# Patient Record
Sex: MALE | Race: NATIVE HAWAIIAN OR OTHER PACIFIC ISLANDER | NOT HISPANIC OR LATINO | URBAN - METROPOLITAN AREA
[De-identification: names, ages, dates, MRNs, and addresses within clinical notes are randomized per-mention and may not be internally consistent; named-entity substitution may affect disease eponyms.]

---

## 2023-07-23 ENCOUNTER — OFFICE VISIT (OUTPATIENT)
Dept: URGENT CARE | Facility: URGENT CARE | Age: 73
End: 2023-07-23
Payer: MEDICARE

## 2023-07-23 VITALS
OXYGEN SATURATION: 93 % | HEART RATE: 82 BPM | TEMPERATURE: 98.6 F | SYSTOLIC BLOOD PRESSURE: 128 MMHG | RESPIRATION RATE: 20 BRPM | WEIGHT: 228.1 LBS | DIASTOLIC BLOOD PRESSURE: 70 MMHG

## 2023-07-23 DIAGNOSIS — S61.214A LACERATION OF RIGHT RING FINGER WITHOUT DAMAGE TO NAIL, FOREIGN BODY PRESENCE UNSPECIFIED, INITIAL ENCOUNTER: Primary | ICD-10-CM

## 2023-07-23 PROCEDURE — 99207 PR NO CHARGE LOS: CPT | Mod: F8 | Performed by: NURSE PRACTITIONER

## 2023-07-23 RX ORDER — LOSARTAN POTASSIUM 25 MG/1
1 TABLET ORAL AT BEDTIME
COMMUNITY

## 2023-07-23 RX ORDER — AMLODIPINE BESYLATE 5 MG/1
1 TABLET ORAL AT BEDTIME
COMMUNITY

## 2023-07-23 RX ORDER — HYDROCHLOROTHIAZIDE 25 MG/1
TABLET ORAL
COMMUNITY
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
## 2023-08-13 ENCOUNTER — HEALTH MAINTENANCE LETTER (OUTPATIENT)
Age: 73
End: 2023-08-13

## 2024-10-06 ENCOUNTER — HEALTH MAINTENANCE LETTER (OUTPATIENT)
Age: 74
End: 2024-10-06

## 2024-12-11 ENCOUNTER — ALLIED HEALTH/NURSE VISIT (OUTPATIENT)
Dept: RESEARCH | Facility: CLINIC | Age: 74
End: 2024-12-11
Payer: MEDICARE

## 2024-12-11 VITALS
WEIGHT: 221 LBS | SYSTOLIC BLOOD PRESSURE: 142 MMHG | HEART RATE: 85 BPM | HEIGHT: 69 IN | DIASTOLIC BLOOD PRESSURE: 65 MMHG | OXYGEN SATURATION: 95 % | BODY MASS INDEX: 32.73 KG/M2

## 2024-12-11 DIAGNOSIS — Z00.6 EXAMINATION OF PARTICIPANT OR CONTROL IN CLINICAL RESEARCH: Primary | ICD-10-CM

## 2024-12-11 PROCEDURE — 99207 PR NO CHARGE NURSE ONLY: CPT

## 2024-12-11 NOTE — PROGRESS NOTES
[place] Inclusion/Exclusion Criteria:    Study Name: [place] (-[id number])      : Joselyn Bauer MD      Study Description: The purpose of this study is to explore potential relationships between physiologic parameters collected from sensor data with physiological changes potentially induced by the administration of the COVID-19 vaccine.     Protocol Version: 4.0 (Version Date: 07-OCT-2024) Consent Version: 4.0 (Version Date: 09-OCT-2024)    Inclusion #  Inclusion Criteria (ALL MUST BE YES)  YES/NO/N/A   1 Be at least 18 years old  Yes   2 Proficient in written and spoken English, defined by self-report   Yes   3 Willing and able to participate in the study procedures and data consent described in the consent form   Yes   4 Able to communicate effectively with and follow instructions from the Study Team    Yes   5   Eligible to receive the updated COVID-19 vaccine based on current CDC recommendations and vaccine prescribing information. (As determined by Sub-I) Yes   6   Able to disclose home address to a healthcare provider or Study Team member to enable (a) device shipping (if necessary) and (b) a 911 call in case of potential emergency (home address will not be kept as study data)  Yes   7    Able to adhere to Lifestyle Considerations (see Section 5.3) throughout study duration (as applicable). These include avoiding taking certain over-the-counter pain relievers or fever reducing medications around the time of vaccination, not taking any recreational drugs (e.g. methamphetamines, cocaine, opioids, cannabis, LSD)  within 72 hours prior to, during and after the ingestible temperature sensor monitoring period; and abstaining from strenuous exercise, ingestion of hot or cold liquids, eating food, chewing gum or mints, brushing teeth or smoking 30 minutes before taking oral temperature measurements.  Yes   8   Participant has their own reliable high-speed broadband internet at their home and  "active at the time of data collection  Yes   9   Have a personal computer, desktop, laptop, tablet, or smartphone with audiovisual capabilities Yes   If any inclusion criteria marked \"No\" please provide detail (If all Yes, N/A): N/A        Exclusion # Exclusion Criteria (ALL MUST BE NO) YES/NO/N/A   1 Participants with tattoos, skin problems or wound(s) on/in the wrist or deltoid (ex: injured or friable skin, skin disorders, or allergic skin reactions, such as eczema, rosacea, impetigo, dermatomyositis, or allergic contact dermatitis), that can interfere with study setup/assessments/vaccination  No   2 Individuals who are pregnant or plan to become pregnant during the study  No   3 Anything that may interfere with proper physiological data acquisition, such as an implantable device (e.g., cardiac pacemaker, automated implantable cardioverter-defibrillator, deep brain stimulator, Inspire upper airway stimulation device) and casts or body braces No   4 Participants that are diagnosed or are suspected to have illnesses affecting motion: e.g., Parkinson's, Essential Tremor, Dystonia, or others at investigator's discretion No   5 Participants that are diagnosed with a condition or taking a medication that impairs the immune system (i.e., active cancer, HIV/AIDS, organ/stem cell transplant recipient, autoimmune disorders, primary immunodeficiencies) No   6 Participants with any medical history, vital sign, or any other study procedure finding/assessment that in the opinion of the investigator could compromise participant safety during study participation or interfere with the study integrity and/or the accurate assessment of the study objectives No   7 Daily use of OTC or prescription medications with antipyretic properties at time of enrollment that is anticipated to continue during the CBT sensor data collection period surrounding administration of vaccines. Low dose aspirin (81 mg or less per day) taken for " "preventative purposes is permissible No   8 Individuals who are unwilling to avoid taking OTC pain relievers and anti-pyrectics for acute mild to moderate pain and fever associated with vaccine administration during the data collection days surrounding its administration No   9 Participant works for a company that develops or sells medical and/or fitness devices (e.g., ECG monitors, wearable fitness bands, sleep monitors, etc.) or are technology journalists (e.g., professional bloggers, TV, magazine, newspaper reporters, etc.) No   10 Overnight travel or travel between time zones planned during CBT sensor data collection nights No   11 Participants with planned overnight travel totaling >= 7 nights during duration of study data collection period No   12 Participant plans on moving or changing address within the study period No   13 Participant is employed in overnight shift work, or otherwise does not maintain a reasonably consistent day/night schedule (e.g., participants who are unable to regularly go to bed between 7pm to 2am and wake up between 4am to 12pm on average >= 3 times a week) No   14 Participants with clinically relevant sleep disturbances and unable to achieve at least 4 hours of continuous sleep on average each night No   If any exclusion criteria marked \"Yes\" please provide detail (If all No, N/A): N/A    Exclusion (a) # Exclusion criteria related to the COVID-19 vaccine:   (ALL MUST BE NO) YES/NO/N/A   1 Participants with a known history of a severe allergic reaction (e.g., anaphylaxis) to any component of the COVID-19 vaccine. No   2 Participants who experienced severe side effects following previous administration of the COVID-19 vaccine including myocarditis, pericarditis, thrombosis, or thrombocytopenia No   3 Participants in whom an additional COVID-19 vaccine is contraindicated. No   If any exclusion criteria marked \"Yes\" please provide detail (If all No, N/A): N/A    Exclusion (b) # Exclusion " criteria related to Ingestible Temperature Sensor:   (ALL MUST BE NO) YES/NO/N/A   1 Participants under the age of 18 No   2 Participant weighs less than 40 kg (88 lbs.) or BMI greater than 44.6 No   3 Participants who are pregnant No   4 Participants with a known diagnosis of obstructive disease of the gastrointestinal tract or a known hernia, Crohn's disease, diverticulitis, or other inflammatory bowel disease. No   5 Participants with a 1st degree relative with any inflammatory bowel disease with suspected hereditary transmission (e.g., Crohn's disease, or ulcerative colitis) No   6 Participants with known history of disordered or impaired gag reflex  No   7 Participants who have problems swallowing food or pills (e.g., dysphagia) No   8 Participants with previous gastrointestinal tract surgery involving the esophagus, stomach, or intestines, excluding intraluminal endoscopy. No   9 Participants with known diagnosis of hypo-motility disorders of the gastrointestinal tract (including chronic constipation with fewer than three spontaneous bowel movements per week No   10 Participants with chronic diarrhea, as defined by 3 or more episodes of diarrhea per week for the last 30 days or >= 3 bowel movements per day No   11 Participants with known diagnosis of felinization of the esophagus (unusual folding of the esophagus)  No   12 Participants with Zenker's diverticulum (a pouch that forms in the upper part of the esophagus) and people with a history of other diverticula.  No   13 Participants who may undergo NMR or MRI scanning within one week of CBT sensor ingestion No   14 Participants with an implantable pulse generator or implantable electro-medical device of any kind (e.g., pacemakers (or implantable pulse generators), implantable cardioverter defibrillators (ICDs), deep brain stimulation (DBS) devices, and left ventricular assist devices (LVADs). No   15 Participants with an implanted or temporarily implanted  "device that uses an external power-source. No   If any exclusion criteria marked \"Yes\" please provide detail (If all No, N/A): N/A    Will the participant continue in the study? Yes  (If \"No\", follow instructions for handling of Screen Failures)    If the participant is eligible to continue in the study, inclusion/exclusion criteria above will be sent to the PI for co-sign.    Enrollment Date:  11-DEC-2024      MD Gala Morel, EP   "

## 2024-12-11 NOTE — PROGRESS NOTES
"  [place] Study Physical Exam  Study Description: The purpose of this study is to explore potential relationships between physiologic parameters collected from sensor data with physiological changes potentially induced by the administration of the COVID-19 vaccine.     Medical History Reviewed? Yes  After extensive review of the entire available medical record, to the best of my knowledge there is no reason to exclude this patient from the study.     Physical Examination  For abnormal findings, please evaluate if the finding is Clinically Significant (by 'CS') or Not Clinically Significant (by 'NCS')  General Appearance   Normal  Head and Neck   Abnormal; NCS wears full dentures  Lungs     Normal  Cardiovascular   Normal   Do they have a stimulator/Pacemaker? No  Gastrointestinal   Normal   Problems swallowing medication? No  ANY history of diverticula (diverticulosis, diverticulitis, etc): No  Any history of GI surgery? No  Bowel habits: Regular, every day; drinks 2 quarts water, 1 cup coffee, 1 can coke and has milk at lunch every day; activity: walks 30 min/d and does stretching exercises   Regular laxative use? No  Musculoskeletal/Extremities Abnormal; NCS well healed scar right mid forearm    Lymph Nodes    Normal  Skin     Normal     Any Tattoos or Skin issues on the wrists or deltoid? No  Neurological    Normal   Any sleep disturbances? (Must get at least 5 hours a night) No   Memory issues?  No    Tremor (If present document)  Present bilateral mild hand tremor  Any balance issues or recent falls?     No    Past Surgical History:   Procedure Laterality Date    radial surgery  1968    removal of all teeth  2017       Vitals:    12/11/24 0937   BP: 142/65   BP Location: Left arm   Patient Position: Sitting   Cuff Size: Adult Large   Pulse: 85   SpO2: 95%   Weight: 100.2 kg (221 lb)   Height: 1.753 m (5' 9\")           Allergies   Allergen Reactions    Codeine              Immunization History   Administered " Date(s) Administered    COVID-19 12+ (2023-24) (NOVAVAX) 09/17/2024    COVID-19 12+ (MODERNA) 03/18/2024    COVID-19 12+ (Pfizer) 09/22/2023       Reminders:  Are they using prescription pain meds? No  Any first degree relatives with inflammatory bowel disease? (Crohn's, ulcerative colitis, etc) No  Any serious medical issues that require treatment and evaluation? No   Any conditions they are following closely with their PCP? No    Have you had any serious issues with previous Covid-19 immunizations? No  COVID Vaccine Screening   Have you received a dose of the Covid-19 vaccine before?   Yes, Novavax  Date of most recent Covid-19 vaccine dose:     17-September-2024   Do you currently have a health condition or are undergoing    treatment that makes you moderately to severely immunocompromised?* No  Have you ever had an allergic reaction to a Covid vaccine?**  No  Have you ever had an allergic reaction to another vaccine or injectable  medication?         No  Have you ever felt dizzy or faint before, during or after a shot?   No    *Ex: treatment of cancer, HIV, organ transplant recipient, immunosuppressive therapy, etc.     **This would include a severe allergic reaction (e.g., anaphylaxis that required treatment with epinephrine or caused you to go to the hospital. It would also include an allergic reaction that caused hives, swelling, or respiratory distress, including wheezing)    Is this subject eligible to receive a Covid-19 vaccine? Yes    Patient does fulfill study inclusion criteria and no exclusion criteria are found. Subject will continue in the study. This decision was made at 10:35    11-DEC-2024    Stefany Hugo PA-C

## 2024-12-11 NOTE — PROGRESS NOTES
[place] Screening Study Note  Study Description: The purpose of this study is to explore potential relationships between physiologic parameters collected from sensor data with physiological changes potentially induced by the administration of the COVID-19 vaccine.       Subject ID:      Demographic Info  Armando Peralta   1950          74 year old    SCREENING   Sex: Male    Multi Racial?: No; Primary:  or Other    Ethnicity: Not  or      Medical Conditions:   Has the subject experienced any relevant past and/or concomitant Medical History (e.g., chronic conditions such as hypertension, cardiovascular disease, stroke, diabetes, kidney disease, peripheral arterial disease, Raynaud's syndrome?  Yes    Condition Ongoing? Start Date (MM/DD/YYYY) End Date (if applicable)   Hypertension Yes JUL- Not Applicable   Obstructive Sleep Apnea Yes Un-Un-2016        Any History of...  Verify with chart review. If any of the below are yes, the subject is a screen fail.  -Divertic___ (diverticulosis, diverticula, diverticulitis, etc.)? No  -Rheumatoid arthritis? No  Lupus? No  Hernia? (Other than inguinal or childhood umbilical)  No  Peptic Ulcer? No  Crohn's Disease? No  In any 1st degree family members? No  Colitis? No  Dysphagia? No  Parkinson s? No  Essential Tremor? No      Concomitant Medications:   Prescribed medications reviewed with the participant. The table below represents their current medications they are taking on a regular basis.        Medication Name (Generic) Class Start Date (MM/DD/YYYY) Ongoing? Dose Unit Frequency Route Indication   Hydrochlorothiazide  Other JUL- Yes 25  mg QD Oral Con Med Cond: Hypertension   Amlodipine Other JUL- Yes 5 mg QD Oral Con Med Cond: Hypertension   Losartan Other JUL- Yes 25 mg QD Oral Con Med Cond: Hypertension       Allergies:   Does the subject have any known allergies to medications, food, a  "vaccine component, or latex? Yes  Allergies   Allergen Reactions    Codeine         Surgical History  Any history of gastrointestinal tract surgery involving the esophagus, stomach, or intestines? No  If there are no GI surgeries, delete whole surgery section.     Family Medical History  Family medical history was reviewed. No first degree relative has a history of any inflammatory bowel disease with suspected hereditary transmission (eg. Crohn's, ulcerative colitis, etc)       Subject Characteristics     Vitals  BP (!) 142/65 (BP Location: Left arm, Patient Position: Sitting, Cuff Size: Adult Large)   Pulse 85   Ht 1.753 m (5' 9\")   Wt 100.2 kg (221 lb)   SpO2 95%   BMI 32.64 kg/m         Avila Scale:  Wrist Circumference: Study Watch Wrist Preferred Watch Wrist Dominant Hand Watch Band Tightness:   3 19.5 cm Right Right Right Natural      Watch Size Preference: 45mm    ENROLLMENT    Was the visit performed? Yes   Date of Enrollment: 11-DEC-2024. All procedures below occurred on this day.    Site Zip Code: Site Time Zone:  Site Location: Protocol Assigned: Eligibility Confirmed:   [address] [address]  Protocol A (COVID) Yes   Plan for Study Kit #1 Delivery: Given to Participant On-Site     [place] Device Accountability Prepped/Dispensed  Prepped & Dispensed Study Kit #1:  All Study Devices included? Yes (including Study Watch, Study Phone, Ambient Sensor, Oral Thermometer and Charging Accessories)  Is this a Replacement Kit? No    Study Phone  ID HSA Research ID I[place]oo Joel ID    [id number]     Study Watch  ID Model  Band Type    [id number] Series 9 Sport Loop     Was Study Kit #1 Shipped to the Participant? No  Were all expected devices received? Yes  Were there Device Issues? No  Was the Study Kit Replaced? No    All devices listed above were dispensed to the participant. Device ID were confirmed prior to dispensation.      Participant was thoroughly educated on study procedure and device care, " staff highlighted the importance of compliance to study procedure. All questions and concerns were addressed, and informed participant to contact study coordinators for any questions. Subject was provided with a copy of the subject instructions for at home review.     Adverse Events Summary:*   Were any Adverse Events (AEs) experienced? No    Protocol Deviation Summary:*   Were there any Protocol Deviations?: No    *If Yes, please complete corresponding form.    Concomitant Medications:  As screening and enrollment appointments occurred on the same day, please refer to the concomitant medications documented above.        11-DEC-2024   DIONICIO Ramírez

## 2024-12-11 NOTE — PROGRESS NOTES
[place] Study Consent    Study Description: The purpose of this study is to explore potential relationships between physiologic parameters collected from sensor data with physiological changes potentially induced by the administration of the COVID-19 vaccine.    Armando Peralta a 74 year old male, was on-site today at [facility] to discuss participation for [place] (-[id number])       The consent form was reviewed with the patient.     The review of the study included:  Study Purpose      Participant Duration, Responsibilities & Expectations    Study Data and Devices    Benefits and Risks of Participation    Compensation and Costs of Participation    Coded Study Data  Voluntary Participation    Study Restrictions  Confidentiality Obligations/Privacy-Related Risks   Injury, Legal, and Data Rights    Authorization to Use and Disclose Your Protected Health Information    Protocol Version: 4.0   Principle Investigator: Joselyn Bauer MD    Subject Number: [id number]      The subject was queried in regards to his willingness to continue and his questions were answered to his satisfaction. The patient has given his agreement to volunteer and participate in the above noted study.     The eConsent and HIPAA form version (Version 4.0 Date 09-OCT-2024) was signed on  11-DEC-2024 with the Clinical Research [facility] of [facility].     A copy of the [place] consent will be placed in subject's medical record. A copy of the consent form was given to the subject today.    Study data is directly entered into Epic and ice per protocol. No study procedures were done prior to Armando Peralta providing informed consent.       11-DEC-2024    DIONICIO Ramírez

## 2024-12-30 ENCOUNTER — ALLIED HEALTH/NURSE VISIT (OUTPATIENT)
Dept: RESEARCH | Facility: CLINIC | Age: 74
End: 2024-12-30
Payer: MEDICARE

## 2024-12-30 DIAGNOSIS — Z00.6 EXAMINATION OF PARTICIPANT OR CONTROL IN CLINICAL RESEARCH: Primary | ICD-10-CM

## 2024-12-30 PROCEDURE — 99207 PR NO CHARGE NURSE ONLY: CPT

## 2024-12-30 NOTE — PROGRESS NOTES
[place] Pill Witness Study Note  Study Description: The purpose of this study is to explore potential relationships between physiologic parameters collected from sensor data with physiological changes potentially induced by the administration of the COVID-19 vaccine.       I supervised while Armando WHATLEY Fabian ingested the smart sensor. Smart sensor was swallowed without complication. Participant felt well after and was able proceeded with study procedures.       30-DEC-2024     Felisa Haney NP

## 2024-12-30 NOTE — PROGRESS NOTES
[place] Pill Day / On-Site 1 Note  Study Description: The purpose of this study is to explore potential relationships between physiologic parameters collected from sensor data with physiological changes potentially induced by the administration of the COVID-19 vaccine.    Subject ID: [id number]     Armando Peralta presents to [facility] for On-Site 1 on 30-DEC-2024. All procedures below occurred on this date.     On-Site Visit 1                                                                Was the visit performed? Yes  Height/Weight and BMI confirmed? Yes    Pregnancy Test Needed? No    CBT Device Kit Accountability   Was the [place] Dispensed? Yes  [place] ID: [id number]     Pill 1 Pill 2 Pill 3   Serial Number [id number] 23:10:66:[id number] 23:10:58:87   Lot Number [id number] 24-10 24-10   Pill Activation Time 10:43 10:43 10:43   Were the above pills activated and dispensed? Yes    Pills were activated by the Device Manager of the week and verified by myself.       CBT Pill Ingestion Log     Pill 1:  Was Pill 1 ingested? Yes  Time of Ingestion: 13:08     Ingestion of Pill 1 was witnessed by Maura Haney NP. Please see their documentation for further details.     Adverse Events Summary:*   Were any Adverse Events (AEs) experienced? No    Protocol Deviation Summary:*   Were there any Protocol Deviations?: No    *If Yes, please complete corresponding form.    Concomitant Medications Summary:    Has the subject taken any medications within 30 days prior to signing the informed consent and/or during the study? (Have they started any new medications since the screening/enrollment visit?) Their medications have not changed since their screening and enrollment visit. Please refer to that documentation for the medication list.       30-DEC-2024  Cass Ramirez RN

## 2025-01-02 ENCOUNTER — VIRTUAL VISIT (OUTPATIENT)
Dept: RESEARCH | Facility: CLINIC | Age: 75
End: 2025-01-02
Payer: MEDICARE

## 2025-01-02 ENCOUNTER — ALLIED HEALTH/NURSE VISIT (OUTPATIENT)
Dept: RESEARCH | Facility: CLINIC | Age: 75
End: 2025-01-02
Payer: MEDICARE

## 2025-01-02 DIAGNOSIS — Z00.6 RESEARCH SUBJECT: Primary | ICD-10-CM

## 2025-01-02 DIAGNOSIS — Z23 HIGH PRIORITY FOR 2019-NCOV VACCINE: ICD-10-CM

## 2025-01-02 DIAGNOSIS — Z00.6 EXAMINATION OF PARTICIPANT OR CONTROL IN CLINICAL RESEARCH: Primary | ICD-10-CM

## 2025-01-02 NOTE — PROGRESS NOTES
[place] Pre-Vaccine Call / Virtual Visit 2 Study Note    Study Description: The purpose of this study is to explore potential relationships between physiologic parameters collected from sensor data with physiological changes potentially induced by the administration of the COVID-19 vaccine.       Subject ID:      Was the visit performed? Yes  Location:     Virtual Visit 2: 02-JAN-2024  Time of Visit (24H): 09:04    Date/Time of Onsite Visit 2 / Vaccine Appointment Confirmed? Yes    Reminders  [x] Check compliance and address any issues   -If there are pending Igloo uploads, bring gateway close to the phone  [x] Are you having any issues with your study devices? No  [x] Continue to follow nightly/daily study procedures   [x] Are you sick today or experiencing any cold/flu-like symptoms? No     -If yes, investigate if rescheduling vaccine appointment is required  [x] Please remember to ingest Pill #3 tonight/the evening before your vaccine appointment   [x] Please bring your Study Watch, Study Phone, Subject Instructions AND GATEWAY to your appointment tomorrow.       Additional Notes: N/A      CBT Pill Ingestion Log      Pill 2     Was Pill Ingested? Yes   Date of Ingestion 31-DEC-2024   Time of Ingestion 17:18      Pill 3     Was Pill Ingested? Yes   Date of Ingestion 1-JAN-2024   Time of Ingestion 14:53     Delete whole section if they cannot provide their ingestion data over the phone.     Adverse Events Summary:*   Were any Adverse Events (AEs) experienced? No    Protocol Deviation Summary:*   Were there any Protocol Deviations?: No    *If Yes, please complete corresponding form.    Concomitant Medications Summary:    Has the subject taken any medications within 30 days prior to signing the informed consent and/or during the study?   Their medications have not changed since their screening and enrollment visit. Please refer to that documentation for the medication list.       02-JAN-2024     Serena  Trenton

## 2025-01-02 NOTE — PROGRESS NOTES
[place] Vaccine Day /On-Site Visit 2 Note  Study Description: The purpose of this study is to explore potential relationships between physiologic parameters collected from sensor data with physiological changes potentially induced by the administration of the COVID-19 vaccine.    Subject ID: [id number]     Armando Peralta presents to [facility] for On-Site 2 on 02-JAN-2025. All procedures below occurred on this day.      On-Site Visit 2                                                                Was the visit performed?: Yes   Did the participant miss any morning or evening surveys since their pill day? No  If yes, they need to complete makeup surveys    CBT Ingestion Log   See last log from this morning phone call    CBT Device Kit Accountability     Was a [place] Dispensed?: Yes, 2224      Pill 4 Pill 5   Serial Number [id number]   Lot Number [id number] 24-10   Pill Activation Time            14:22 14:24     The subject has passed two CBT sensors at the time of their vaccine appointment. Thus only two additional CBT sensor was activated and dispensed to the participant.     Smartpills were activated by the Device Manager and verified by myself.      CBT Pill Ingestion Log     Pill 4:  Was Pill 4 ingested? Yes  Time of Ingestion: 14:26     Please review the provider note for vaccine eligibility screening and administration information.     Adverse Events Summary:*   Were any Adverse Events (AEs) experienced? No    Protocol Deviation Summary:*   Were there any Protocol Deviations?: No    *If Yes, please complete corresponding form.    Concomitant Medications Summary:    Has the subject taken any medications within 30 days prior to signing the informed consent and/or during the study? (Have they started any new medications since the screening/enrollment visit?) Their medications have not changed since their screening and enrollment visit. Please refer to that documentation for the medication list.        02-JAN-2025   Cass Ramirez RN

## 2025-01-02 NOTE — PROGRESS NOTES
[facility] Vaccine Screening & Administration Note:  Study Description: The purpose of this study is to explore potential relationships between physiologic parameters collected from sensor data with physiological changes potentially induced by the administration of the COVID-19 vaccine.    Subject ID:      I saw Armando WHATLEY Fabian today to review their eligibility for Covid-19 vaccination and administer the vaccine when appropriate. Vaccine information sheet (VIS) was provided to the participant.     COVID Vaccine Screening   Are you sick today or experiencing any cold/flu-like symptoms?   No  Have you received a dose of the Covid-19 vaccine before?   Yes, Novavax  Date of most recent Covid-19 vaccine dose:     17-September-2024   Do you currently have a health condition or are undergoing    treatment that makes you moderately to severely immunocompromised?* No  Have you ever had an allergic reaction to a Covid vaccine?**  No  Have you ever had an allergic reaction to another vaccine or injectable  medication?         No  Have you ever felt dizzy or faint before, during or after a shot?   No    *Ex: treatment of cancer, HIV, organ transplant recipient, immunosuppressive therapy, etc.     **This would include a severe allergic reaction (e.g., anaphylaxis that required treatment with epinephrine or caused you to go to the hospital. It would also include an allergic reaction that caused hives, swelling, or respiratory distress, including wheezing)    Vaccine Administration      Was a vaccine administered?  Yes  5 Rights of Dosing Confirmed?  Yes   -The Right patient?   -The Right drug?   -The Right time?    -The Right dose?   -The Right route?    Vaccine Administration Information  Administration Date Administration Time Initials of  Initials of Vaccine Verifier    01/02/25  14:17   JANGIE     Covid-19 Vaccine Information    Lot Number Expiration Route Arm    Pfizer [id number]  26-April-2025  Intramuscular Right   Updated vaccination card and AVS were provided to the participant.    I examined the participant 15 minutes after administration. There were no complications like shortness of breath, throat/chest tightness, sore throat, wheezing or generalized itching. Injection site looks fine without redness, induration or swelling. Participant is able to proceed with study procedures.        02-JAN-2025   Stefany Hugo PA-C

## 2025-01-07 ENCOUNTER — VIRTUAL VISIT (OUTPATIENT)
Dept: RESEARCH | Facility: CLINIC | Age: 75
End: 2025-01-07
Payer: MEDICARE

## 2025-01-07 DIAGNOSIS — Z00.6 RESEARCH SUBJECT: Primary | ICD-10-CM

## 2025-01-07 PROCEDURE — 99207 PR NO CHARGE-RESEARCH SERVICE: CPT | Mod: 93

## 2025-01-07 NOTE — PROGRESS NOTES
[facility] Post-VD Call / Virtual Visit 3 Note    Study Description: The purpose of this study is to explore potential relationships between physiologic parameters collected from sensor data with physiological changes potentially induced by the administration of the COVID-19 vaccine.       Subject ID:      Was the visit performed? Yes  Location:     Virtual Visit 3: 07-JAN-2025  Time of Visit (24H): 9:53      Reminders  [x] Check compliance and address any issues   -If there are pending [place] uploads, bring gateway close to the phone  [x] Are you having any issues with your study devices? No   [x] Continue to follow nightly/daily study procedures  [x] Are you sick today or experiencing any cold/flu-like symptoms? No  [x] Please remember to ingest Pill #5 (and #6) according to the schedule described at your last appointment      Additional Notes: N/A    CBT Pill Ingestion Log      Pill 5   Was the Smart Pill Ingested? Yes   Date of Ingestion 03-JAN-2025   Time of Ingestion 16:39   Delete whole section if they cannot provide their ingestion data over the phone.       Adverse Events Summary:*   Were any Adverse Events (AEs) experienced? No    Protocol Deviation Summary:*   Were there any Protocol Deviations?: No    *If Yes, please complete corresponding form.    Concomitant Medications Summary:    Has the subject taken any medications within 30 days prior to signing the informed consent and/or during the study? Their medications have not changed since their screening and enrollment visit. Please refer to that documentation for the medication list.       07-JAN-2025   Conchis Alcantara

## 2025-01-30 ENCOUNTER — ALLIED HEALTH/NURSE VISIT (OUTPATIENT)
Dept: RESEARCH | Facility: CLINIC | Age: 75
End: 2025-01-30
Payer: MEDICARE

## 2025-01-30 DIAGNOSIS — Z00.6 RESEARCH SUBJECT: Primary | ICD-10-CM

## 2025-01-30 NOTE — PROGRESS NOTES
[facility] End of Study Note  -Including Device Accountability Returned and Subject Disposition    Study Description: The purpose of this study is to explore potential relationships between physiologic parameters collected from sensor data with physiological changes potentially induced by the administration of the COVID-19 vaccine.       Subject ID:        Was the visit performed?  Yes   Was Study Exit Survey Completed on the study phone?: Yes    Subject Disposition:  Did the subject complete the study? Yes   If no, Why? N/A    Which Visit did the participant exit from the study? End of Study Visit  Study Completion Date: 30-JAN-2025      [facility] Device Accountability Returned Form    Was the Device Kit Returned? Yes   Date Device Kit Returned:  30-JAN-2025   Were There Device Issues?  No   Was the Phone Returned?   Returned Phone ID: Yes  [id number]   Was the Watch Returned?   Returned Watch ID: Yes  [id number]   Was the Deal Returned?   Returned Deal ID: Yes  [id number]   Were All Chargers and Accessories Returned?  Yes        Adverse Events Summary:*   Were any Adverse Events (AEs) experienced? No    Protocol Deviation Summary:*   Were there any Protocol Deviations?:  No    *If Yes, please complete corresponding form.    Concomitant Medications Summary:    Has the subject taken any medications within 30 days prior to signing the informed consent and/or during the study? (Have they started any new medications?) Their medications have not changed since their screening and enrollment visit. Please refer to that documentation for the medication list.     30-JAN-2025   Cass Ramirez RN    3